# Patient Record
(demographics unavailable — no encounter records)

---

## 2017-03-10 NOTE — ACF
Admission Forms Criteria


                                              SEIZURE





Clinical Indications for Admission to Inpatient Care





                                                         (Place 'X' for any and 

all applicable criteria): 


   


Admission is indicated for seizure and ANY ONE of the following(1)(2)(3)(4)(5): 





[X]I.    Inpatient admission required rather than observation care (Also use 

Seizure: Observation 


         Care Criteria as appropriate) because of ANY ONE of the following:


               [ ]a)   Altered mental status that is severe or persistent


               [ ]b)   New focal neurologic deficit that is severe or persistent


               [ ]c)   Metabolic disorder (eg, hypoglycemia, hyponatremia) that 

is severe or persistent


               [X]d)   Recurrent seizure


               [ ]e)   Outpatient antiseizure regimen cannot be established (eg

, patient cannot tolerate 


                        medication, initiation requires inpatient care)


               [ ]f)    Need for ongoing intravenous infusion of antiseizure 

medication


               [ ]g)   Cardiac arrhythmias of immediate concern


               [ ]h)   Cerebral bleeding, hydrocephalus, or vasospasm 

monitoring (14)


               [ ]i)    Increased intracranial pressure or cerebral edema 

monitoring (15)


               [ ]j)    Other treatment or monitoring requiring inpatient 

admission


[ ]II.   Status epilepticus [A] or repetitive seizures not controlled with 

emergent treatment (6)(8)


[ ]III.  Brain disorder (eg, tumor, edema, and hydrocephalus) that requiring 

monitoring or intervention 


        available only at inpatient level of care.


[ ]IV. Brain insult (eg, severe trauma, stroke, drug toxicity, or withdrawal) 

that requires monitoring 


        or intervention available only at inpatient level of care (10)(11)











Extended stay beyond goal length of stay may be needed for (22)


[ ]a)   Complications of status epilepticus


[ ]b)   Refractory status epilepticus


[ ]c)   Etiology-specific therapy for conditions such as CNS infection, head 

injury,eclampsia, 


         severe metabolic abnormalities, and brain tumor


[ ]d)   Residual neurologic damage,


[ ]e)   Initiation of significant change to anticonvulsant treatment 


[ ]f)    Older patients (65 years or older)


[ ]g)   Patient requiring intubation (eg, to protect airway)











The original [employer] content created by [email or web address] has been revised. 


The portions of the content which have been revised are identified through the 

use of italic text or in bold, and [facility] 


has neither reviewed nor approved the modified material. All other unmodified 

content is copyright  [facility] [employer].





Please see references footnoted in the original [facility] edition 

2016


Admission Criteria Met?:  Yes








ROSETTA CHAO Mar 10, 2017 17:03

## 2017-03-10 NOTE — RAD
CT of the head without contrast, 3/10/2017:



History: Seizures.



Comparison is made to a study from 6/26/2012. The ventricles are within normal

limits in size. There is no shift of the midline structures. There is no

evidence of acute intracranial hemorrhage or mass effect. There is mild

cerebral and cerebellar atrophy.



IMPRESSION: No acute intracranial abnormality is detected.







PQRS Compliance Statement:



One or more of the following individualized dose reduction techniques were

utilized for this examination:

1. Automated exposure control

2. Adjustment of the mA and/or kV according to patient size

3. Use of iterative reconstruction technique

## 2017-03-10 NOTE — EKG
[facility]

              [address] [place] [address]

Test Date:    2017-03-10               Test Time:    13:55:42

Pat Name:     ELISEO BOLTON             Department:   

Patient ID:   [id number]           Room:          

Gender:       F                        Technician:   

:          [id number]               Requested By: BIRD CALI

Order Number: [id number]            Reading MD:     

                                 Measurements

Intervals                              Axis          

Rate:         72                       P:            

NJ:                                    QRS:          29

QRSD:         86                       T:            67

QT:           394                                    

QTc:          433                                    

                           Interpretive Statements

IRREGULAR RHYTHM, NO P-WAVE FOUND

QRS(T) CONTOUR ABNORMALITY

CONSIDER ANTEROSEPTAL MYOCARDIAL DAMAGE

T ABNORMALITY IN ANTERIOR LEADS

HIGH LATERAL LEADS

RI6.01          Unconfirmed report

No previous ECG available for comparison

## 2017-03-10 NOTE — PHYS DOC
Past Medical History


Past Medical History:  Anxiety, Dementia, Diabetes-Type II, Hypertension, 

Seizure


Past Surgical History:  No Surgical History


Alcohol Use:  None


Drug Use:  None





Adult General


Chief Complaint


Chief Complaint:  SEIZURE





HPI


HPI


80-year-old female presenting to the emergency department today after having 

reportedly 3 seizures at home. Lasted approximately 4 minutes. Patient's family 

here reporting that the patient returned to baseline. She denies any fall or 

head injury. She currently takes antiepileptic medications Keppra 500 mg twice 

a day. She was postictal however upon EMS arrival the patient was responsive 

and alert.





Review of systems was negative for chest pain shortness of breath abdominal 

pain nausea or vomiting. The patient denies numbness weakness or tingling. All 

other review of systems is negative unless otherwise noted in history of 

present illness.





Review of Systems


Review of Systems


SEE ABOVE.





Allergies


Allergies





 Allergies








Coded Allergies Type Severity Reaction Last Updated Verified


 


  codeine Allergy Intermediate  5/9/15 No


 


  iodine Allergy Intermediate  5/9/15 No











Physical Exam


Physical Exam





Constitutional: Well developed, well nourished, no acute distress, non-toxic 

appearance. 


HENT: Normocephalic, atraumatic, bilateral external ears normal, oropharynx 

moist, no oral exudates, nose normal. []


Eyes: PERRLA, EOMI, conjunctiva normal, no discharge.  


Neck: Normal range of motion, no tenderness, supple, no stridor. [] 


Cardiovascular:Heart rate regular rhythm, no murmur []


Lungs & Thorax:  Bilateral breath sounds clear to auscultation 


Abdomen: Bowel sounds normal, soft, no tenderness, no masses, no pulsatile 

masses. [] 


Skin: Warm, dry, no erythema, no rash.  


Back: No tenderness, no CVA tenderness. [] 


Extremities: No tenderness, no cyanosis, no clubbing, ROM intact, no edema. [] 


Neurologic: 





Neuro exam:





Mental status: Awake oriented and alert x3





Cranial nerves: Extraocular movements intact, eyebrows leona bilaterally smile 

symmetric, uvula elevation, shoulder shrug intact, tongue protrusion normal





Sensation: equal and normal in all extremities





Strength: 5/5 in upper and lower extremities bilaterally





Psychologic: Affect normal, judgement normal, mood normal. []





Current Patient Data


Vital Signs





 Vital Signs








  Date Time  Temp Pulse Resp B/P Pulse Ox O2 Delivery O2 Flow Rate FiO2


 


3/10/17 13:50 97.8 72 20 213/98 96 Room Air  





 97.8       








Lab Values





 Laboratory Tests








Test


  3/10/17


13:57 3/10/17


14:20


 


Urine Opiates Screen Neg (NEG)   


 


Urine Methadone Screen Neg (NEG)   


 


Urine Barbiturates Neg (NEG)   


 


Urine Phencyclidine Screen Neg (NEG)   


 


Urine


Amphetamine/Methamphetamine Neg (NEG)  


  


 


 


Urine Benzodiazepines Screen Neg (NEG)   


 


Urine Cocaine Screen Neg (NEG)   


 


Urine Cannabinoids Screen Neg (NEG)   


 


Urine Ethyl Alcohol Neg (NEG)   


 


White Blood Count


  


  6.4x10^3/uL


(4.0-11.0)


 


Red Blood Count


  


  5.24x10^6/uL


(3.50-5.40)


 


Hemoglobin


  


  15.4g/dL


(12.0-15.5)


 


Hematocrit


  


  46.4%


(36.0-47.0)


 


Mean Corpuscular Volume  89fL ()  


 


Mean Corpuscular Hemoglobin  29pg (25-35)  


 


Mean Corpuscular Hemoglobin


Concent 


  33g/dL (31-37)


 


 


Red Cell Distribution Width


  


  13.4%


(11.5-14.5)


 


Platelet Count


  


  222x10^3/uL


(140-400)


 


Neutrophils (%) (Auto)  45% (31-73)  


 


Lymphocytes (%) (Auto)  41% (24-48)  


 


Monocytes (%) (Auto)  10% (0-9)  H


 


Eosinophils (%) (Auto)  3% (0-3)  


 


Basophils (%) (Auto)  1% (0-3)  


 


Neutrophils # (Auto)


  


  2.9x10^3uL


(1.8-7.7)


 


Lymphocytes # (Auto)


  


  2.7x10^3/uL


(1.0-4.8)


 


Monocytes # (Auto)


  


  0.6x10^3/uL


(0.0-1.1)


 


Eosinophils # (Auto)


  


  0.2x10^3/uL


(0.0-0.7)


 


Basophils # (Auto)


  


  0.1x10^3/uL


(0.0-0.2)


 


Prothrombin Time


  


  13.2SEC


(11.7-14.0)


 


Prothrombin Time INR  1.1 (0.8-1.1)  


 


PTT  29SEC (24-38)  


 


Sodium Level


  


  148mmol/L


(136-145)  H


 


Potassium Level


  


  4.5mmol/L


(3.5-5.1)


 


Chloride Level


  


  107mmol/L


()


 


Carbon Dioxide Level


  


  29mmol/L


(21-32)


 


Anion Gap  12 (6-14)  


 


Blood Urea Nitrogen  8mg/dL (7-20)  


 


Creatinine


  


  0.7mg/dL


(0.6-1.0)


 


Estimated GFR


(Cockcroft-Gault) 


  97.4  


 


 


Glucose Level


  


  145mg/dL


(70-99)  H


 


Calcium Level


  


  9.3mg/dL


(8.5-10.1)





 Laboratory Tests


3/10/17 14:20








 Laboratory Tests


3/10/17 14:20














EKG


EKG


[]





Radiology/Procedures


Radiology/Procedures


[]





Course & Med Decision Making


Course & Med Decision Making


Pertinent Labs and Imaging studies reviewed. (See chart for details)





[] 80-year-old female presenting to the emergency department today with 3 

seizure like activities. On arrival the patient was back to normal neurologic 

exam. The patient follows with our neurology team on a regular basis. She 

reports taking her medications as prescribed. Vital signs showed significant 

hypertension. Otherwise unremarkable. Pertinent physical exam shows neurologic 

exam unremarkable. Head CT unremarkable. Blood work obtained which showed 

normal CBC. Chemistry panel shows mild hypernatremia and hyperglycemia. 

Otherwise UDS negative. The patient was then admitted for further evaluation 

workup and care including monitoring of her seizure condition. Neurology 

consult placed.





Dragon Disclaimer


Dragon Disclaimer


This electronic medical record was generated, in whole or in part, using a 

voice recognition dictation system.





Departure


Departure


Impression:  


 Primary Impression:  


 Seizure


Disposition:  09 ADMITTED AS INPATIENT


Admitting Physician:  Yang Gaston


Condition:  STABLE


Referrals:  


YANG GASTON MD (PCP)








BIRD CALI MD Mar 10, 2017 16:34

## 2017-03-11 NOTE — PDOC2
NEUROLOGY CONSULT


Date of Admission


Date of Admission


DATE: 3/11/17 


TIME: 13:41





Reason for Consult


Reason for Consult:


Seizures





Referring Physician


Referring Physician:


Dr. Humphries





Source


Source:  Caregiver, Chart review, Patient





History of Present Illness


History of Present Illness


The patient is an 80-year-old right-handed female whom I follow in the office 

for Alzheimer's dementia and epilepsy. I last saw her 9 months ago. She had 3 

seizures yesterday and was admitted. There has been no noncompliance, stress, 

sleep deprivation, or infection. This is her usual course for her epilepsy with 

flurries of seizures  by months of seizure freedom. She has rather 

severe dementia but still lives at home with her  and does well. There 

is no history of stroke or head injury.





Past Medical History


Cardiovascular:  HTN


CENTRAL NERVOUS SYSTEM:  Dementia, Seizure


Endocrine:  Diabetes





Past Surgical History


Past Surgical History:  No pertinent history





Family History


Family History:  CAD





Social History


Social History


, nonsmoker, nondrinker





Current Medications


Current Medications





 Current Medications


Labetalol HCl (Normodyne) 20 mg 1X  ONCE IVP  Last administered on 3/10/17at 16:

47;  Start 3/10/17 at 16:45;  Stop 3/10/17 at 16:46;  Status DC


Levetiracetam (Keppra) 500 mg BID PO  Last administered on 3/11/17at 08:11;  

Start 3/10/17 at 21:00;  Stop 3/11/17 at 09:43;  Status DC


Lorazepam (Ativan) 0.5 mg TID PO  Last administered on 3/11/17at 08:11;  Start 3

/10/17 at 21:00


Metformin HCl (Glucophage) 1,000 mg BIDWMEALS PO  Last administered on 3/11/

17at 08:11;  Start 3/11/17 at 08:00


Metoprolol Succinate (Toprol Xl) 25 mg DAILY PO  Last administered on 3/11/17at 

08:12;  Start 3/11/17 at 09:00


Mirtazapine (Remeron) 15 mg QHS PO  Last administered on 3/10/17at 20:45;  

Start 3/10/17 at 21:00


Olanzapine (Zyprexa) 5 mg DAILY PO  Last administered on 3/11/17at 08:11;  

Start 3/10/17 at 19:00


Insulin Aspart (Novolog) 0-5 UNITS TIDWMEALS SQ ;  Start 3/11/17 at 08:00


Dextrose 12.5 gm PRN Q15MIN  PRN IV SEE COMMENTS;  Start 3/10/17 at 18:30


Pneumococcal Polyvalent Vaccine (Do NOT chart on this placeholder) 1 each 1X  

ONCE MC ;  Start 3/10/17 at 19:45;  Stop 3/10/17 at 19:46;  Status UNV


Pneumococcal Polyvalent Vaccine (Pneumovax 23) 0.5 ml ONCE ONCE VAX IM  Last 

administered on 3/10/17at 20:46;  Start 3/10/17 at 20:00;  Stop 3/10/17 at 20:01

;  Status DC


Levetiracetam (Keppra) 2,000 mg BID PO ;  Start 3/11/17 at 21:00


Losartan Potassium (Cozaar) 100 mg DAILY PO  Last administered on 3/11/17at 12:

16;  Start 3/11/17 at 11:00





Active Scripts


Active


Reported


Ondansetron Odt (Ondansetron) 4 Mg Tab.rapdis 1 Tab PO PRN Q6-8HRS


Lisinopril 40 Mg Tablet 1 Tab PO DAILY


Levetiracetam 500 Mg Tablet 500 Mg PO BID


Metformin Hcl 1,000 Mg Tablet 1 Tab PO BID


Lorazepam 0.5 Mg Tablet 0.5 Mg PO TID


Escitalopram Oxalate 10 Mg Tablet 10 Mg PO DAILY


Mirtazapine 15 Mg Tablet 1 Tab PO QHS


Donepezil Hcl 10 Mg Tab.rapdis 10 Mg PO HS


Olanzapine 5 Mg Tablet 5 Mg PO DAILY


Metoprolol Succinate ( Xl ) (Metoprolol Succinate) 25 Mg Tab.er.24h 25 Mg PO 

DAILY





Allergies


Allergies:  


Coded Allergies:  


     codeine (Verified  Allergy, Intermediate, 3/11/17)


     iodine (Verified  Allergy, Intermediate, 3/11/17)





ROS


Review of System


Patient denies fevers, chills, weight loss, dyspnea, angina, abdominal pain, 

change in bowels, or dysuria. 14 point review of systems is negative.





Physical Exam


Physical Examination


PHYSICAL EXAMINATION:  


Vital signs: see above.  


General appearance is normal and in no acute distress.  


HEENT:  Normocephalic and nontraumatic.  Eyes, nose, ears, and throat are 

unremarkable.  


Neck is supple. No lymphadenopathy. No bruits are heard over the carotid 

artery.  No crepitus. 


NEUROLOGICAL EXAMINATION:  


Mental Status Examination:  Alert. Oriented to place, and person, not date. 

Answers questions and follows commends. Pupils are equal round and reactive to 

light and accommodation. Extraocular movements are intact.   Visual field exam 

shows no defect on the direct confrontation.  No motor or sensory deficits on 

the facial exam.  Uvula in the midline and the soft palate elevated 

symmetrically.  No deviation of the tongue to any direction.  Gross hearing is 

normal.  Shoulder shrug normal.  Muscle tone is normal.  Muscle strength is 5/

5.  Deep tendon reflexes are 2+.  Plantar reflex is with flexion response 

bilaterally.  Finger-to-nose test performance is accurate.  Alternative 

movements are accurate.  Romberg test is negative. Gait is normal.  Sensory 

exam shows no deficits. No cerebellar signs are elicited.





Vitals


VITALS





 Vital Signs








  Date Time  Temp Pulse Resp B/P Pulse Ox O2 Delivery O2 Flow Rate FiO2


 


3/11/17 12:16  81  140/68    


 


3/11/17 10:50 99.5  16  96 Room Air  





 99.5       











Labs


Labs





Laboratory Tests








Test


  3/10/17


13:57 3/10/17


14:20 3/10/17


20:58 3/11/17


07:09


 


Urine Opiates Screen Neg (NEG)    


 


Urine Methadone Screen Neg (NEG)    


 


Urine Barbiturates Neg (NEG)    


 


Urine Phencyclidine Screen Neg (NEG)    


 


Urine


Amphetamine/Methamphetamine Neg (NEG) 


  


  


  


 


 


Urine Benzodiazepines Screen Neg (NEG)    


 


Urine Cocaine Screen Neg (NEG)    


 


Urine Cannabinoids Screen Neg (NEG)    


 


Urine Ethyl Alcohol Neg (NEG)    


 


White Blood Count


  


  6.4x10^3/uL


(4.0-11.0) 


  


 


 


Red Blood Count


  


  5.24x10^6/uL


(3.50-5.40) 


  


 


 


Hemoglobin


  


  15.4g/dL


(12.0-15.5) 


  


 


 


Hematocrit


  


  46.4%


(36.0-47.0) 


  


 


 


Mean Corpuscular Volume  89fL ()   


 


Mean Corpuscular Hemoglobin  29pg (25-35)   


 


Mean Corpuscular Hemoglobin


Concent 


  33g/dL (31-37) 


  


  


 


 


Red Cell Distribution Width


  


  13.4%


(11.5-14.5) 


  


 


 


Platelet Count


  


  222x10^3/uL


(140-400) 


  


 


 


Neutrophils (%) (Auto)  45% (31-73)   


 


Lymphocytes (%) (Auto)  41% (24-48)   


 


Monocytes (%) (Auto)  10% (0-9)   


 


Eosinophils (%) (Auto)  3% (0-3)   


 


Basophils (%) (Auto)  1% (0-3)   


 


Neutrophils # (Auto)


  


  2.9x10^3uL


(1.8-7.7) 


  


 


 


Lymphocytes # (Auto)


  


  2.7x10^3/uL


(1.0-4.8) 


  


 


 


Monocytes # (Auto)


  


  0.6x10^3/uL


(0.0-1.1) 


  


 


 


Eosinophils # (Auto)


  


  0.2x10^3/uL


(0.0-0.7) 


  


 


 


Basophils # (Auto)


  


  0.1x10^3/uL


(0.0-0.2) 


  


 


 


Prothrombin Time


  


  13.2SEC


(11.7-14.0) 


  


 


 


Prothromb Time International


Ratio 


  1.1 (0.8-1.1) 


  


  


 


 


Activated Partial


Thromboplast Time 


  29SEC (24-38) 


  


  


 


 


Sodium Level


  


  148mmol/L


(136-145) 


  


 


 


Potassium Level


  


  4.5mmol/L


(3.5-5.1) 


  


 


 


Chloride Level


  


  107mmol/L


() 


  


 


 


Carbon Dioxide Level


  


  29mmol/L


(21-32) 


  


 


 


Anion Gap  12 (6-14)   


 


Blood Urea Nitrogen  8mg/dL (7-20)   


 


Creatinine


  


  0.7mg/dL


(0.6-1.0) 


  


 


 


Estimated GFR


(Cockcroft-Gault) 


  97.4 


  


  


 


 


Glucose Level


  


  145mg/dL


(70-99) 


  


 


 


Calcium Level


  


  9.3mg/dL


(8.5-10.1) 


  


 


 


Glucose (Fingerstick)


  


  


  122mg/dL


(70-99) 130mg/dL


(70-99)














Test


  3/11/17


11:16 


  


  


 


 


Glucose (Fingerstick)


  130mg/dL


(70-99) 


  


  


 








Laboratory Tests








Test


  3/10/17


13:57 3/10/17


14:20 3/10/17


20:58 3/11/17


07:09


 


Urine Opiates Screen Neg (NEG)    


 


Urine Methadone Screen Neg (NEG)    


 


Urine Barbiturates Neg (NEG)    


 


Urine Phencyclidine Screen Neg (NEG)    


 


Urine


Amphetamine/Methamphetamine Neg (NEG) 


  


  


  


 


 


Urine Benzodiazepines Screen Neg (NEG)    


 


Urine Cocaine Screen Neg (NEG)    


 


Urine Cannabinoids Screen Neg (NEG)    


 


Urine Ethyl Alcohol Neg (NEG)    


 


White Blood Count


  


  6.4x10^3/uL


(4.0-11.0) 


  


 


 


Red Blood Count


  


  5.24x10^6/uL


(3.50-5.40) 


  


 


 


Hemoglobin


  


  15.4g/dL


(12.0-15.5) 


  


 


 


Hematocrit


  


  46.4%


(36.0-47.0) 


  


 


 


Mean Corpuscular Volume  89fL ()   


 


Mean Corpuscular Hemoglobin  29pg (25-35)   


 


Mean Corpuscular Hemoglobin


Concent 


  33g/dL (31-37) 


  


  


 


 


Red Cell Distribution Width


  


  13.4%


(11.5-14.5) 


  


 


 


Platelet Count


  


  222x10^3/uL


(140-400) 


  


 


 


Neutrophils (%) (Auto)  45% (31-73)   


 


Lymphocytes (%) (Auto)  41% (24-48)   


 


Monocytes (%) (Auto)  10% (0-9)   


 


Eosinophils (%) (Auto)  3% (0-3)   


 


Basophils (%) (Auto)  1% (0-3)   


 


Neutrophils # (Auto)


  


  2.9x10^3uL


(1.8-7.7) 


  


 


 


Lymphocytes # (Auto)


  


  2.7x10^3/uL


(1.0-4.8) 


  


 


 


Monocytes # (Auto)


  


  0.6x10^3/uL


(0.0-1.1) 


  


 


 


Eosinophils # (Auto)


  


  0.2x10^3/uL


(0.0-0.7) 


  


 


 


Basophils # (Auto)


  


  0.1x10^3/uL


(0.0-0.2) 


  


 


 


Prothrombin Time


  


  13.2SEC


(11.7-14.0) 


  


 


 


Prothromb Time International


Ratio 


  1.1 (0.8-1.1) 


  


  


 


 


Activated Partial


Thromboplast Time 


  29SEC (24-38) 


  


  


 


 


Sodium Level


  


  148mmol/L


(136-145) 


  


 


 


Potassium Level


  


  4.5mmol/L


(3.5-5.1) 


  


 


 


Chloride Level


  


  107mmol/L


() 


  


 


 


Carbon Dioxide Level


  


  29mmol/L


(21-32) 


  


 


 


Anion Gap  12 (6-14)   


 


Blood Urea Nitrogen  8mg/dL (7-20)   


 


Creatinine


  


  0.7mg/dL


(0.6-1.0) 


  


 


 


Estimated GFR


(Cockcroft-Gault) 


  97.4 


  


  


 


 


Glucose Level


  


  145mg/dL


(70-99) 


  


 


 


Calcium Level


  


  9.3mg/dL


(8.5-10.1) 


  


 


 


Glucose (Fingerstick)


  


  


  122mg/dL


(70-99) 130mg/dL


(70-99)














Test


  3/11/17


11:16 


  


  


 


 


Glucose (Fingerstick)


  130mg/dL


(70-99) 


  


  


 











Images


Images


Head CT:  No acute intracranial abnormality is detected.





Assessment/Plan


Assessment/Plan


Impression:


Long-standing epilepsy, tending to have flurries of seizures.


Long-standing dementia





Recommendations:


Okay for discharge today


Follow-up with me in the office in 2 months


Continue current medications. Donepezil was omitted from her medications and I 

have written for it.





Thank you for letting me help with the patient's care.








LETHA MADISON MD Mar 11, 2017 13:46

## 2017-03-11 NOTE — HP
ADMIT DATE:  03/10/2017



CHIEF COMPLAINT AND HISTORY OF PRESENT ILLNESS:  This is an 80-year-old black

female patient of Dr. Humphries who was admitted through the Emergency Room with 3

seizures on the day of admission.  They lasted a couple of minutes at a time and

were described this as consistent with staring off and stiffening up.  They came

in succession and then had no further spells.  After this was all done over a

period of time, she returned to baseline, was brought to the Emergency Room

because of them and was awake and alert at her normal level by the time of

presentation to the Emergency Room after some initial postictal time in the

field. 



PAST MEDICAL HISTORY:  Remarkable for prior seizure.  She has a history of

hypertension, diabetes, dementia, anxiety.



PAST SURGICAL HISTORY:  She has no past surgical history.



MEDICATIONS:  Brought with the patient.  Listed on the computer and have been

addressed.



ALLERGIES:  SHE IS ALLERGIC TO CODEINE AND IODINE.



FAMILY HISTORY AND SOCIAL HISTORY:  Noncontributory.



REVIEW OF SYSTEMS:  Is remarkable for having no complaints whatsoever.  Her son

who present during the exam describes the episodes at home as he was there

witnessing them.  They deny any other significant problems preceding this or

leading up to this episode. 



PHYSICAL EXAMINATION:

GENERAL:  She is a well-developed, well-nourished black female, in no acute

distress.

VITAL SIGNS:  Stable.  She is afebrile.

HEAD, EYES, EARS, NOSE AND THROAT:  Unremarkable.

NECK:  Supple.  No thyromegaly.

CHEST:  Clear to auscultation and percussion.

HEART:  Regular rate and rhythm, without S3, S4 or murmur.

ABDOMEN:  Soft, nontender, without hepatosplenomegaly or mass.

EXTREMITIES:  Without cyanosis, clubbing or edema.

NEUROLOGIC:  Nonfocal.



IMAGING:  CT head shows no acute changes.  Drug screen is normal.



LABORATORY DATA:  Essentially unremarkable.



IMPRESSION:  Seizure times 3 on day of admission with multiple other problems

listed above.



PLAN:  The patient has been admitted.  Neurology will be consulted for

suggestions as far as further treatment and the patient will be monitored,

managed and treated appropriately.

 



______________________________

JUDITH BALTAZAR MD



DR:  SIMON/citlalli  JOB#:  [id number] / [id number]

DD:  03/11/2017 10:00  DT:  03/11/2017 10:28

## 2017-03-12 NOTE — DS
DATE OF DISCHARGE:  03/11/2017



PRIMARY DIAGNOSIS:  Epilepsy with flurry of seizures on the day of admission.



ADDITIONAL DIAGNOSES:  Dementia, hypertension, diabetes, anxiety.



CHIEF COMPLAINT AND HISTORY OF PRESENT ILLNESS:  This is an 80-year-old black

female patient of Dr. Humphries, who was admitted through the Emergency Room with 3

seizures on the day of admission.  She does have a long history of seizures with

intermittent flurries of the same.  She had 3 on the day of admission, with

admission through the Emergency Room.



SUMMARY OF STAY:  The patient was admitted.  Home meds were continued.  Dr. Patiño saw her in consultation, who thought no further workup was necessary

other than the CT head, which had been done and showed no evidence of an acute

intracranial abnormality.  Laboratory during the stay included a CBC that was

essentially unremarkable, BMP that was fairly unremarkable with decent blood

sugars throughout the stay, a protime that was normal, PTT that was normal and a

urine drug screen that was normal.  I discussed in depth with her sons ____ she

had good compliance with her medications at home ____ the same and it was felt

she could be dismissed.



DISPOSITION:  The patient is discharged home.  Regular ADA diet.  Activity as

tolerated.  Office of Dr. Humphries as scheduled and she is to resume exact regular

home medications, which are listed on the med rec and have been addressed.

 



______________________________

JUDITH BALTAZAR MD



DR:  SIMON/citlalli  JOB#:  [id number] / [id number]

DD:  03/11/2017 21:38  DT:  03/12/2017 00:37

## 2017-04-30 NOTE — ACF
Admission Forms Criteria


                                  URINARY COMPLICATIONS





Clinical Indications for Inpatient Care 


                                                                    (Place 'X' 

for any and all applicable criteria):





Ongoing inpatient care may be indicated for urinary complications with ANY ONE 

of the following: 


[ ]I.    Urinary tract infection requiring inpatient care as indicated by ANY 

ONE of the following(8)(19)(20):


         [ ]a)  Severe symptoms (eg, high fever, severe pain)


         [ ]b)  Vomiting or dehydration requiring ongoing inpatient care


         [ ]c)  IV antibiotic needs that cannot be managed at lower level of 

care 


         [ ]d)  Hemodynamic instability


         [ ]e)  Obstruction of collecting system by stone or tumor


[ ]II.   Urinary retention requiring drainage or surgery (3)(4)(5)(17)(18)


[ ]III.  Renal failure (Use Renal Failure  Criteria  for further information.)


[ ]IV. Oliguria(30)


[ ]V.  Post obstructive diuresis requiring close monitoring of urine output and 

intravenous compensation for excessive fluid losses(33)











Extended stay beyond goal length of stay for primary condition may be needed 

until ALL of the following are present(3)(4)(5)(8):


[ ]a)   Renal function (creatinine) at baseline, or daily decreases in 

creatinine consistent with renal function return


[ ]b)   Voiding adequately or with urinary catheter or percutaneous suprapubic 

tube and management regimen in place 


         that is performable at lower level of care.


[ ]c)   Urine output adequate


[ ]d)   Fever absent or resolving


[ ]e)   Infection absent or treatable at next level of care











The original [employer] content created by [employer] has been revised. 


The portions of the content which have been revised are identified through the 

use of italic text or in bold, and [facility] [employer] [facility] [employer] 


has neither reviewed nor approved the modified material. All other unmodified 

content is copyright  [employer]


     


Please see references footnoted in the original [employer] edition 

2016








ERIC VEE Apr 30, 2017 00:11

## 2017-04-30 NOTE — PHYS DOC
Past Medical History


Past Medical History:  Anxiety, Dementia, Diabetes-Type II, Hypertension, 

Seizure


Past Surgical History:  No Surgical History


Alcohol Use:  None


Drug Use:  None





Adult General


Chief Complaint


Chief Complaint:  URINARY FREQUENCY





HPI


HPI


Patient is a 80  year old female who presents to the emergency department for 

evaluation due to concern of possible urinary tract infection. Patient was 

brought to the emergency department by EMS. Patient has history of type 2 

diabetes mellitus and dementia. Patient is a somewhat poor historian. Patient 

denies any complaints. EMS reports that the patient has been called EMS after 

the patient was taken to the bathroom and reportedly had dark urine that was 

foul-smelling. The  was concerned that the patient may have urinary 

tract infection and thus called EMS for the patient to be brought to the 

emergency department for evaluation. Patient has had no reported fevers and 

patient states that she has not had any falls or any difficulty ambulating at 

home. Patient denies any pain, nausea, or dysuria.





Review of Systems


Review of Systems





Constitutional: Denies fever or chills []


Eyes: Denies change in visual acuity, redness, or eye pain []


HENT: Denies nasal congestion or sore throat []


Respiratory: Denies cough or shortness of breath []


Cardiovascular: Denies chest pain or edema []


GI: Denies abdominal pain, nausea, vomiting, bloody stools or diarrhea []


: Denies dysuria or hematuria []


Musculoskeletal: Denies back pain or joint pain []


Integument: Denies rash or skin lesions []


Neurologic: Denies headache, focal weakness or sensory changes []





Allergies


Allergies





 Allergies








Coded Allergies Type Severity Reaction Last Updated Verified


 


  codeine Allergy Intermediate  3/11/17 Yes


 


  iodine Allergy Intermediate  3/11/17 Yes











Physical Exam


Physical Exam





Constitutional: Well developed, well nourished, no acute distress, non-toxic 

appearance. []


HENT: Normocephalic, atraumatic, bilateral external ears normal, oropharynx 

moist, no oral exudates, nose normal. []


Eyes: PERRLA, EOMI, conjunctiva normal, no discharge. [] 


Neck: Normal range of motion, no tenderness, supple, no stridor. [] 


Cardiovascular:Heart rate regular rhythm, no murmur []


Lungs & Thorax:  Bilateral breath sounds clear to auscultation []


Abdomen: Bowel sounds normal, soft, no tenderness, no masses, no pulsatile 

masses. [] 


Skin: Warm, dry, no erythema, no rash. [] 


Back: No tenderness, no CVA tenderness. [] 


Extremities: No tenderness, no cyanosis, no clubbing, ROM intact, no edema. [] 


Neurologic: Alert and oriented X 3, normal motor function, normal sensory 

function, no focal deficits noted. []





Current Patient Data


Vital Signs








 Vital Signs








  Date Time  Temp Pulse Resp B/P Pulse Ox O2 Delivery O2 Flow Rate FiO2


 


4/29/17 23:58 97.8 65 18 165/79 97 Room Air  





 97.8       











Lab Values





 Laboratory Tests








Test


  4/30/17


00:01


 


Urine Collection Type U cath  


 


Urine Color Yellow  


 


Urine Clarity Clear  


 


Urine pH 6.0  


 


Urine Specific Gravity 1.015  


 


Urine Protein


  Negativemg/dL


(NEG-TRACE)


 


Urine Glucose (UA)


  Negativemg/dL


(NEG)


 


Urine Ketones (Stick)


  Negativemg/dL


(NEG)


 


Urine Blood


  Negative (NEG)


 


 


Urine Nitrite


  Negative (NEG)


 


 


Urine Bilirubin


  Negative (NEG)


 


 


Urine Urobilinogen Dipstick


  1.0mg/dL (0.2


mg/dL)


 


Urine Leukocyte Esterase


  Negative (NEG)


 


 


Urine RBC Occ/HPF (0-2)  


 


Urine WBC Occ/HPF (0-4)  


 


Urine Squamous Epithelial


Cells Few/LPF  


 


 


Urine Bacteria 0/HPF (0-FEW)  


 


Urine Hyaline Casts Few/HPF  


 


Urine Mucus Mod/LPF  











EKG


EKG


Not performed []





Radiology/Procedures


Radiology/Procedures


Not performed []





Course & Med Decision Making


Course & Med Decision Making


Pertinent Labs and Imaging studies reviewed. (See chart for details)





Patient's UA does not reveal convincing evidence for active urinary tract 

infection. I spoke with the patient and the patient's  about results. 

The  states that he was glad to hear there was no infection and he 

stated he felt comfortable taking the patient home tonight. Patient otherwise 

has no complaints. The patient will be discharged home with recommended routine 

follow-up in one week with the patient's primary doctor and return to emergency 

department for any worsening symptoms.





Dragon Disclaimer


Dragon Disclaimer


This electronic medical record was generated, in whole or in part, using a 

voice recognition dictation system.





Departure


Departure


Impression:  


 Primary Impression:  


 Feared condition not demonstrated


Disposition:  01 HOME, SELF-CARE


Condition:  GOOD


Referrals:  


YANG GASTON MD (PCP)





Additional Instructions:


Your urinalysis did not show any evidence of infection at this time. Follow-up 

with your primary doctor in 1 week as needed. Return to the emergency 

department for any worsening symptoms.








JESSY BARDALES MD Apr 30, 2017 00:05

## 2017-05-10 NOTE — KCIC
Bilateral digital screening mammograms with CAD: 

HISTORY 

Routine screening. 

COMPARISON 

Comparison is made to previous studies dated 03/23/2016 and 03/11/2015. 

FINDINGS 

Breast density category A. 

The skin and nipples show no abnormalities. No abnormal lymph nodes are 

seen in the axilla. The breast parenchyma is predominately fatty. There 

are no dominant masses, suspicious calcifications or architectural 

distortions. Note is made of some vascular calcifications bilaterally. 

IMPRESSION 

No evidence of malignancy. Recommend routine annual mammographic 

screening. 

This study was interpreted with the benefit of Computerized Aided 

Detection (CAD). 

Mammography is not 100% sensitive in detecting breast cancer. Therefore, a

self breast exam and a clinical breast exam are very important. A negative

mammogram does not negate a clinically suspicious finding and should not 

result in a delay in biopsying a clinically suspicious abnormality. 

BI-RADS category 1. Negative. 

This patient's information has been entered into a reminder system for the

patient to be notified with the results of this examination and a target 

date for her next mammograms. 

 

Electronically signed by: Angelica Givens MD (May 10, 2017 11:27:40)

## 2017-05-10 NOTE — KCIC
DEXA study 

Indication: Osteoporosis screening Reason For Study Reason: POST 

MENOPAUSAL, EVALUATE FOR OSTEOPOROSIS / Spl. Instructions: / History: 

Findings: 





score:-2.2 

According to the world health organization (WHO): 

Normal: T score at or above -1 

Osteopenia: T score between -1 and -2.5 

Osteoporosis: T score at or below -2.5 

Impression: 

- Findings are consistent with osteopenia.

 

 

 

Electronically signed by: Russ Mckee (May 10, 2017 12:10:09)

## 2017-08-09 NOTE — RAD
Indication:Pain



Grayscale images of the abdomen were obtained.



Comparison none



Liver:No focal mass is seen in the visualized liver. The hepatic vasculature

appears normal

Gallbladder:Cholelithiasis is present. At least one large gallstone is seen

within the gallbladder. The common bile duct diameter of approximately 6 mm is

normal given the patient's age.

Spleen:Normal

Pancreas:As visualized normal

Kidneys:Normal

Abdominal aorta and IVC:Normal

Ancillary findings:None



Impression:Cholelithiasis. At least one large stone is present in the

gallbladder

## 2018-12-03 NOTE — RAD
CT HEAD AND CERVICAL SPINE WO

 

Clinical indications: PAIN S/P FALL, DEMENTIA.  

 

NONCONTRAST HEAD CT

 

COMPARISON: September 27, 2018.

 

Technique: Noncontrast axial cross sectional scanning of the head was 

performed. 

 

PQRS compliance Statement

 

One or more of the following individualized dose reduction techniques were

utilized for this study:

1.  Automated exposure control

2.  Adjustment of the mA and/or kV according to patient size

3.  Use of iterative reconstruction technique

 

 

 

Findings: No acute intracranial hemorrhage or midline shift or mass-effect

or hydrocephalus or extra-axial fluid collection is seen. No new focal 

hypodense area or sulci effacement is seen to indicate an acute infarct or

edema radiographically.  No skull fracture or pneumocephalus is seen. No 

opacification of the mastoid sinuses or the paranasal sinuses is seen. The

maxillary sinuses are not completely seen in this study.

 

Impression:  No acute intracranial abnormality is seen.

 

CERVICAL SPINE CT WITHOUT CONTRAST

 

TECHNIQUE: Noncontrast helical CT scanning of the cervical spine was 

performed. Multiplanar 2-D reconstructions were generated.

 

 

FINDINGS: No acute fracture or discitis or osteolytic process is evident. 

No anterolisthesis is seen. No perching of facet joints is evident. 

Degenerative facet arthropathy and degenerative endplate spurring and 

degenerative disc space narrowing is seen throughout the cervical spine.

 

IMPRESSION: No acute fracture.

 

Electronically signed by: John Carpenter MD (12/3/2018 3:35 PM) [id number]

## 2018-12-03 NOTE — RAD
Left humerus, 2 views, 12/3/2018:

 

HISTORY: Fall, pain

 

The bony structures are demineralized. No fracture is identified. There is

moderate subcutaneous edema in the the superior aspect of the upper arm 

laterally.

 

IMPRESSION: No acute bony abnormality is detected.

 

Electronically signed by: Rick Moritz, MD (12/3/2018 3:37 PM) [facility]

## 2018-12-03 NOTE — PHYS DOC
Past Medical History


Past Medical History:  Anxiety, Dementia, Diabetes-Type II, Hypertension, 

Seizure


Past Medical History


Limited due to dementia


Past Surgical History


Limited due to dementia


Additional Information:  


Nonsmoker


Alcohol Use:  None


Drug Use:  None


Social History


Limited due to dementia





Adult General


Chief Complaint


Chief Complaint:  UPPER EXTREMITY INJURY





HPI


HPI


83 y/o female presents with history of mechanical trip and fall at home just 

prior to arrival.  Patient reports she was "playing around" with her  at 

time of injury.   reports they both lost their balance and fell.  

Patient reports significant swelling and bruising to left upper arm.  Small 

wound which is bleeding.   denies loss of consciousness. Patient 

headache or neck pain.  Patient denies other injury.  Reports use of ASA daily. 





Limited due to dementia





Review of Systems


Review of Systems





Musculoskeletal: Denies back pain or joint pain []


Integument: Report bruising and swelling to left upper arm


Neurologic: Denies headache, focal weakness or sensory changes []





Limited due to dementia.





Current Medications


Current Medications





Current Medications








 Medications


  (Trade)  Dose


 Ordered  Sig/Zhou  Start Time


 Stop Time Status Last Admin


Dose Admin


 


 Neomycin/


 Polymyxin/


 Bacitracin


  (Triple


 Antibiotic


 Ointment)  1 pkt  1X  ONCE  12/3/18 16:45


 12/3/18 16:46 DC 12/3/18 16:39


1 PKT











Allergies


Allergies





Allergies








Coded Allergies Type Severity Reaction Last Updated Verified


 


  codeine Allergy Intermediate  12/3/18 Yes


 


  iodine Allergy Intermediate  12/3/18 Yes











Physical Exam


Physical Exam





Constitutional: Well developed, well nourished, no acute distress, non-toxic 

appearance. []


HENT: Normocephalic, atraumatic, bilateral TMs normal, oropharynx moist, nose 

normal


Eyes: PERRL, EOMI, conjunctiva normal, no discharge. [] 


Neck: Normal range of motion, no tenderness, supple


Cardiovascular: Heart rate regular rhythm, no murmur []


Lungs & Thorax:  Bilateral breath sounds clear to auscultation []


Abdomen: Soft, no tenderness


Skin: Warm, dry, no erythema, ecchymosis and swelling to left upper arm


Back: No midline tenderness, no CVA tenderness. [] 


Extremities: Pelvis stable, Lower extremities with full ROM, Left upper arm 

ecchymosis and swelling concerning for hematoma, Small 0.5cm laceration to 

anterior upper arm with sanguinous leakage


Neurologic: Alert and oriented X 2, normal motor function, normal sensory 

function, no focal deficits noted. []


Psychologic: Affect normal, judgement normal, mood normal. []





Current Patient Data


Vital Signs





 Vital Signs








  Date Time  Temp Pulse Resp B/P (MAP) Pulse Ox O2 Delivery O2 Flow Rate FiO2


 


12/3/18 16:42  88  173/89 (117) 97 Room Air  


 


12/3/18 15:56   21     


 


12/3/18 14:45 98.3       





 98.3       











EKG


EKG


[]





Radiology/Procedures


Radiology/Procedures


PROCEDURE: HUMERUS LEFT





Left humerus, 2 views, 12/3/2018:


 


HISTORY: Fall, pain


 


The bony structures are demineralized. No fracture is identified. There is


moderate subcutaneous edema in the the superior aspect of the upper arm 


laterally.


 


IMPRESSION: No acute bony abnormality is detected.


 


Electronically signed by: Rick Moritz, MD (12/3/2018 3:37 PM) [facility]








PROCEDURE: CT HEAD AND CERVICAL SPINE WO





CT HEAD AND CERVICAL SPINE WO


 


Clinical indications: PAIN S/P FALL, DEMENTIA.  


 


NONCONTRAST HEAD CT


 


COMPARISON: September 27, 2018.


 


Technique: Noncontrast axial cross sectional scanning of the head was 


performed. 


 


RS compliance Statement


 


One or more of the following individualized dose reduction techniques were


utilized for this study:


1.  Automated exposure control


2.  Adjustment of the mA and/or kV according to patient size


3.  Use of iterative reconstruction technique


 


 


 


Findings: No acute intracranial hemorrhage or midline shift or mass-effect


or hydrocephalus or extra-axial fluid collection is seen. No new focal 


hypodense area or sulci effacement is seen to indicate an acute infarct or


edema radiographically.  No skull fracture or pneumocephalus is seen. No 


opacification of the mastoid sinuses or the paranasal sinuses is seen. The


maxillary sinuses are not completely seen in this study.


 


Impression:  No acute intracranial abnormality is seen.


 


CERVICAL SPINE CT WITHOUT CONTRAST


 


TECHNIQUE: Noncontrast helical CT scanning of the cervical spine was 


performed. Multiplanar 2-D reconstructions were generated.


 


 


FINDINGS: No acute fracture or discitis or osteolytic process is evident. 


No anterolisthesis is seen. No perching of facet joints is evident. 


Degenerative facet arthropathy and degenerative endplate spurring and 


degenerative disc space narrowing is seen throughout the cervical spine.


 


IMPRESSION: No acute fracture.


 


Electronically signed by: John Carpenter MD (12/3/2018 3:35 PM) [place] [id number]





Course & Med Decision Making


Course & Med Decision Making


Pertinent Labs and Imaging studies reviewed. (See chart for details)





Elderly patient with pmh of dementia presents with report of mechanical trip 

and fall at home which was witnessed by her .  Denies LOC, headache, or 

neck pain.  Reports left upper arm pain, bruising, and swelling.  Physical exam 

concerning for underlying fracture and/or hematoma.  Wound cleaned and dressed.

  Small laceration not requiring suture repair.  ICE and ACE bandage applied. 

Due to dementia combined with use of daily ASA cannot fully exclude 

intracranial or cervical spine injury. CT head/cervical spine obtained without 

acute process.  XR of humerus without acute fracture or dislocation.  ACE 

bandage continued. 





Patient stable for discharge with outpatient follow-up with PCP. Discussed 

findings and plan with patient and family, who acknowledge understanding and 

agreement.





Dragon Disclaimer


Dragon Disclaimer


This electronic medical record was generated, in whole or in part, using a 

voice recognition dictation system.





Splinting


Splinting :  


   Location:  left upper arm


   Pre-Made Type:  ACE bandage


   Pre-Proc Neuro Vasc Exam:  normal


   Post-Proc Neuro Vasc Exam:  normal, unchanged from pre-exam





Departure


Departure


Impression:  


 Primary Impression:  


 Hematoma


Disposition:  01 HOME, SELF-CARE


Condition:  STABLE


Referrals:  


ALYX ADHIKARI (PCP)


Patient Instructions:  Hematoma, Easy-to-Read, RICE - Routine Care for Injuries

, Easy-to-Read





Additional Instructions:  


Hold Aspirin tonight.





Use over the counter Tylenol as needed for pain.











YAW JUARES DO Dec 3, 2018 16:36

## 2019-06-11 NOTE — RAD
DATE: 6/11/2019



EXAM: MAMMO FREDDIE SCREENING BILATERAL



HISTORY: Routine screening



COMPARISON: 6/11/2018



This study was interpreted with the benefit of Computerized Aided Detection

(CAD).





Breast Density: SCATTERED The breast parenchyma shows scattered fibroglandular

densities. Breast parenchyma level B.





FINDINGS: 2-D and 3-D tomosynthesis imaging was performed in CC and MLO

projections.  An old breast biopsy marker is present laterally in the right

breast.  No suspicious breast densities are seen.  Minimal benign type

calcification is present.  No suspicious microcalcifications have developed.  





IMPRESSION: There is no mammographic evidence of malignancy in either breast.







BI-RADS CATEGORY: 2 BENIGN FINDING(S)



RECOMMENDED FOLLOW-UP: 12M 12 MONTH FOLLOW-UP



PQRS compliance statement: Patient information was entered into a reminder

system with a target due date     for the next mammogram.



Mammography is a sensitive method for finding small breast cancers, but it

does not detect them all and is not a substitute for careful clinical

examination.  A negative mammogram does not negate a clinically suspicious

finding and should not result in delay in biopsying a clinically suspicious

abnormality.



"Our facility is accredited by the American College [employer] Mammography

Program."